# Patient Record
Sex: MALE | Race: BLACK OR AFRICAN AMERICAN | NOT HISPANIC OR LATINO | URBAN - METROPOLITAN AREA
[De-identification: names, ages, dates, MRNs, and addresses within clinical notes are randomized per-mention and may not be internally consistent; named-entity substitution may affect disease eponyms.]

---

## 2021-09-23 ENCOUNTER — OFFICE VISIT (OUTPATIENT)
Dept: URBAN - METROPOLITAN AREA CLINIC 92 | Facility: CLINIC | Age: 71
End: 2021-09-23

## 2021-10-12 ENCOUNTER — OFFICE VISIT (OUTPATIENT)
Dept: URBAN - METROPOLITAN AREA CLINIC 92 | Facility: CLINIC | Age: 71
End: 2021-10-12
Payer: MEDICARE

## 2021-10-12 DIAGNOSIS — R19.8 ALTERNATING CONSTIPATION AND DIARRHEA: ICD-10-CM

## 2021-10-12 PROCEDURE — 99213 OFFICE O/P EST LOW 20 MIN: CPT | Performed by: INTERNAL MEDICINE

## 2021-10-12 RX ORDER — OLANZAPINE 2.5 MG/1
TABLET ORAL
Qty: 0 | Refills: 0 | Status: ACTIVE | COMMUNITY
Start: 1900-01-01

## 2021-10-12 RX ORDER — METOPROLOL SUCCINATE 25 MG/1
TABLET, EXTENDED RELEASE ORAL
Qty: 0 | Refills: 0 | Status: ACTIVE | COMMUNITY
Start: 1900-01-01

## 2021-10-12 RX ORDER — DEXLANSOPRAZOLE 60 MG/1
CAPSULE, DELAYED RELEASE ORAL
Qty: 0 | Refills: 0 | Status: ACTIVE | COMMUNITY
Start: 1900-01-01

## 2021-10-12 RX ORDER — ESCITALOPRAM 20 MG/1
TAKE 1 TABLET (20 MG) BY ORAL ROUTE ONCE DAILY TABLET, FILM COATED ORAL 1
Qty: 0 | Refills: 0 | Status: ACTIVE | COMMUNITY
Start: 1900-01-01

## 2021-10-12 RX ORDER — FLECAINIDE ACETATE 50 MG/1
TABLET ORAL
Qty: 0 | Refills: 0 | Status: ACTIVE | COMMUNITY
Start: 1900-01-01

## 2021-10-12 RX ORDER — APIXABAN 5 MG/1
TABLET, FILM COATED ORAL
Qty: 0 | Refills: 0 | Status: ACTIVE | COMMUNITY
Start: 1900-01-01

## 2021-10-12 RX ORDER — ATORVASTATIN CALCIUM 80 MG/1
TABLET, FILM COATED ORAL
Qty: 0 | Refills: 0 | Status: ACTIVE | COMMUNITY
Start: 1900-01-01

## 2021-10-12 RX ORDER — LATANOPROST/PF 0.005 %
INSTILL 1 DROP INTO AFFECTED EYE(S) BY OPHTHALMIC ROUTE ONCE DAILY IN THE EVENING DROPS OPHTHALMIC (EYE) 1
Qty: 1 | Refills: 0 | Status: ACTIVE | COMMUNITY
Start: 1900-01-01

## 2021-10-12 RX ORDER — METFORMIN HYDROCHLORIDE 500 MG/1
TABLET, COATED ORAL
Qty: 0 | Refills: 0 | Status: ACTIVE | COMMUNITY
Start: 1900-01-01

## 2021-10-12 NOTE — HPI-TODAY'S VISIT:
Pt with several year hx of alternating BMs between diarrhea and constipation. Previously GPP and SIBO tests neg. Trial of zenpep did not help. Colonoscopy 2020 with formed stool and bx neg for colitis. Suspected overflow diarrhea.  Pt denies gi bleeding, nocturnal stools or wt loss unintentionally.

## 2021-11-24 ENCOUNTER — TELEPHONE ENCOUNTER (OUTPATIENT)
Dept: URBAN - METROPOLITAN AREA CLINIC 92 | Facility: CLINIC | Age: 71
End: 2021-11-24

## 2022-07-05 ENCOUNTER — TELEPHONE ENCOUNTER (OUTPATIENT)
Dept: URBAN - METROPOLITAN AREA CLINIC 6 | Facility: CLINIC | Age: 72
End: 2022-07-05

## 2022-07-11 ENCOUNTER — OFFICE VISIT (OUTPATIENT)
Dept: URBAN - METROPOLITAN AREA CLINIC 17 | Facility: CLINIC | Age: 72
End: 2022-07-11
Payer: MEDICARE

## 2022-07-11 VITALS
SYSTOLIC BLOOD PRESSURE: 95 MMHG | HEIGHT: 70 IN | WEIGHT: 247 LBS | TEMPERATURE: 97.9 F | HEART RATE: 71 BPM | DIASTOLIC BLOOD PRESSURE: 65 MMHG | BODY MASS INDEX: 35.36 KG/M2

## 2022-07-11 DIAGNOSIS — E66.9 OBESITY (BMI 30-39.9): ICD-10-CM

## 2022-07-11 DIAGNOSIS — K59.09 CHRONIC CONSTIPATION WITH OVERFLOW: ICD-10-CM

## 2022-07-11 DIAGNOSIS — D86.9 SARCOIDOSIS: ICD-10-CM

## 2022-07-11 DIAGNOSIS — I48.0 PAROXYSMAL ATRIAL FIBRILLATION: ICD-10-CM

## 2022-07-11 DIAGNOSIS — K27.9 PUD (PEPTIC ULCER DISEASE): ICD-10-CM

## 2022-07-11 DIAGNOSIS — E13.9 DIABETES 1.5, MANAGED AS TYPE 2: ICD-10-CM

## 2022-07-11 DIAGNOSIS — Z79.01 ANTICOAGULATION ADEQUATE: ICD-10-CM

## 2022-07-11 DIAGNOSIS — F41.9 ANXIETY: ICD-10-CM

## 2022-07-11 PROCEDURE — 99214 OFFICE O/P EST MOD 30 MIN: CPT | Performed by: INTERNAL MEDICINE

## 2022-07-11 RX ORDER — LATANOPROST/PF 0.005 %
INSTILL 1 DROP INTO AFFECTED EYE(S) BY OPHTHALMIC ROUTE ONCE DAILY IN THE EVENING DROPS OPHTHALMIC (EYE) 1
Qty: 1 | Refills: 0 | Status: ACTIVE | COMMUNITY
Start: 1900-01-01

## 2022-07-11 RX ORDER — METFORMIN HYDROCHLORIDE 500 MG/1
TABLET, COATED ORAL
Qty: 0 | Refills: 0 | Status: ACTIVE | COMMUNITY
Start: 1900-01-01

## 2022-07-11 RX ORDER — DEXLANSOPRAZOLE 60 MG/1
CAPSULE, DELAYED RELEASE ORAL
Qty: 0 | Refills: 0 | Status: ACTIVE | COMMUNITY
Start: 1900-01-01

## 2022-07-11 RX ORDER — ESCITALOPRAM 20 MG/1
TAKE 1 TABLET (20 MG) BY ORAL ROUTE ONCE DAILY TABLET, FILM COATED ORAL 1
Qty: 0 | Refills: 0 | Status: ACTIVE | COMMUNITY
Start: 1900-01-01

## 2022-07-11 RX ORDER — ATORVASTATIN CALCIUM 80 MG/1
TABLET, FILM COATED ORAL
Qty: 0 | Refills: 0 | Status: ACTIVE | COMMUNITY
Start: 1900-01-01

## 2022-07-11 RX ORDER — OLANZAPINE 2.5 MG/1
TABLET ORAL
Qty: 0 | Refills: 0 | Status: ACTIVE | COMMUNITY
Start: 1900-01-01

## 2022-07-11 RX ORDER — FLECAINIDE ACETATE 50 MG/1
TABLET ORAL
Qty: 0 | Refills: 0 | Status: ACTIVE | COMMUNITY
Start: 1900-01-01

## 2022-07-11 RX ORDER — APIXABAN 5 MG/1
TABLET, FILM COATED ORAL
Qty: 0 | Refills: 0 | Status: ACTIVE | COMMUNITY
Start: 1900-01-01

## 2022-07-11 RX ORDER — METOPROLOL SUCCINATE 25 MG/1
TABLET, EXTENDED RELEASE ORAL
Qty: 0 | Refills: 0 | Status: ACTIVE | COMMUNITY
Start: 1900-01-01

## 2022-07-11 NOTE — HPI-TODAY'S VISIT:
2018 67 yo male here for a follow up visit. He has seen ENT and has been tried on a few different medications and is now scheduled for an UGI series. He says his throat burning is not as bad as it was initially. He is taking Dexilant in the am and zantac in the pm. He says the coughing at night is no longer present except he eats and lays down immediately. He has lost about 30 lbs since 12/2015 and is working hard on achieving his goal weight. But also says he is worried that he is loosing weight and doesnt want to be loosing weight because something is wrong. He will discuss this with DR Alas. He has been worried and his appetite is a little low. He does not want an appetite stimulant. ENT MD gave him an antianxiety pill which he takes only prn. Because of some recent stressors, he is now having to take miralax to achieve a BM daily.  7/11/22 I have not seen since 2016 In the interim he has seen Tolu Forte, Namrata Ahuja.  Says BMs are irregular.  Does not think miralax and metamucil are working.  Last week was having 3-4 BMs a day very loose.  In last few days BMs are "almost like normal but I always feel like I still got to go". But then he will have the urge to go and be unproductive.  Has not taken miralax or benefiber in the last 2 weeks.  Usually would take miralax when he has not had a BM then will have 3-4 days of loose BMs.  When he does not have aBM will have LLQ crampy discomfort.Improves with BM. ongoing for years.  Dr Motley performed a colonoscopy in 2020

## 2022-08-09 ENCOUNTER — WEB ENCOUNTER (OUTPATIENT)
Dept: URBAN - METROPOLITAN AREA CLINIC 17 | Facility: CLINIC | Age: 72
End: 2022-08-09

## 2022-08-09 ENCOUNTER — OFFICE VISIT (OUTPATIENT)
Dept: URBAN - METROPOLITAN AREA CLINIC 17 | Facility: CLINIC | Age: 72
End: 2022-08-09
Payer: MEDICARE

## 2022-08-09 ENCOUNTER — DASHBOARD ENCOUNTERS (OUTPATIENT)
Age: 72
End: 2022-08-09

## 2022-08-09 VITALS
BODY MASS INDEX: 35.07 KG/M2 | DIASTOLIC BLOOD PRESSURE: 67 MMHG | TEMPERATURE: 97.2 F | HEART RATE: 81 BPM | HEIGHT: 70 IN | SYSTOLIC BLOOD PRESSURE: 102 MMHG | WEIGHT: 245 LBS

## 2022-08-09 DIAGNOSIS — E66.9 OBESITY (BMI 30-39.9): ICD-10-CM

## 2022-08-09 DIAGNOSIS — E13.9 DIABETES 1.5, MANAGED AS TYPE 2: ICD-10-CM

## 2022-08-09 DIAGNOSIS — K59.09 CHRONIC CONSTIPATION WITH OVERFLOW: ICD-10-CM

## 2022-08-09 DIAGNOSIS — K27.9 PUD (PEPTIC ULCER DISEASE): ICD-10-CM

## 2022-08-09 DIAGNOSIS — Z79.01 ANTICOAGULATION ADEQUATE: ICD-10-CM

## 2022-08-09 DIAGNOSIS — D86.9 SARCOIDOSIS: ICD-10-CM

## 2022-08-09 DIAGNOSIS — F41.9 ANXIETY: ICD-10-CM

## 2022-08-09 DIAGNOSIS — I48.0 PAROXYSMAL ATRIAL FIBRILLATION: ICD-10-CM

## 2022-08-09 PROBLEM — 282825002: Status: ACTIVE | Noted: 2022-07-11

## 2022-08-09 PROBLEM — 162864005: Status: ACTIVE | Noted: 2022-07-11

## 2022-08-09 PROBLEM — 31541009: Status: ACTIVE | Noted: 2022-07-11

## 2022-08-09 PROBLEM — 365634009: Status: ACTIVE | Noted: 2022-07-11

## 2022-08-09 PROBLEM — 426875007: Status: ACTIVE | Noted: 2022-07-11

## 2022-08-09 PROBLEM — 13200003: Status: ACTIVE | Noted: 2022-07-11

## 2022-08-09 PROBLEM — 48694002: Status: ACTIVE | Noted: 2022-07-11

## 2022-08-09 PROCEDURE — 99213 OFFICE O/P EST LOW 20 MIN: CPT | Performed by: INTERNAL MEDICINE

## 2022-08-09 RX ORDER — METFORMIN HYDROCHLORIDE 500 MG/1
TABLET, COATED ORAL
Qty: 0 | Refills: 0 | Status: ACTIVE | COMMUNITY
Start: 1900-01-01

## 2022-08-09 RX ORDER — OLANZAPINE 2.5 MG/1
TABLET ORAL
Qty: 0 | Refills: 0 | Status: ACTIVE | COMMUNITY
Start: 1900-01-01

## 2022-08-09 RX ORDER — METOPROLOL SUCCINATE 25 MG/1
TABLET, EXTENDED RELEASE ORAL
Qty: 0 | Refills: 0 | Status: ACTIVE | COMMUNITY
Start: 1900-01-01

## 2022-08-09 RX ORDER — APIXABAN 5 MG/1
TABLET, FILM COATED ORAL
Qty: 0 | Refills: 0 | Status: ACTIVE | COMMUNITY
Start: 1900-01-01

## 2022-08-09 RX ORDER — ATORVASTATIN CALCIUM 80 MG/1
TABLET, FILM COATED ORAL
Qty: 0 | Refills: 0 | Status: ACTIVE | COMMUNITY
Start: 1900-01-01

## 2022-08-09 RX ORDER — FLECAINIDE ACETATE 50 MG/1
TABLET ORAL
Qty: 0 | Refills: 0 | Status: ACTIVE | COMMUNITY
Start: 1900-01-01

## 2022-08-09 RX ORDER — ESCITALOPRAM 20 MG/1
TAKE 1 TABLET (20 MG) BY ORAL ROUTE ONCE DAILY TABLET, FILM COATED ORAL 1
Qty: 0 | Refills: 0 | Status: ACTIVE | COMMUNITY
Start: 1900-01-01

## 2022-08-09 RX ORDER — DEXLANSOPRAZOLE 60 MG/1
CAPSULE, DELAYED RELEASE ORAL
Qty: 0 | Refills: 0 | Status: ACTIVE | COMMUNITY
Start: 1900-01-01

## 2022-08-09 RX ORDER — LATANOPROST/PF 0.005 %
INSTILL 1 DROP INTO AFFECTED EYE(S) BY OPHTHALMIC ROUTE ONCE DAILY IN THE EVENING DROPS OPHTHALMIC (EYE) 1
Qty: 1 | Refills: 0 | Status: ACTIVE | COMMUNITY
Start: 1900-01-01

## 2022-08-09 NOTE — HPI-TODAY'S VISIT:
2018 67 yo male here for a follow up visit. He has seen ENT and has been tried on a few different medications and is now scheduled for an UGI series. He says his throat burning is not as bad as it was initially. He is taking Dexilant in the am and zantac in the pm. He says the coughing at night is no longer present except he eats and lays down immediately. He has lost about 30 lbs since 12/2015 and is working hard on achieving his goal weight. But also says he is worried that he is loosing weight and doesnt want to be loosing weight because something is wrong. He will discuss this with DR Alas. He has been worried and his appetite is a little low. He does not want an appetite stimulant. ENT MD gave him an antianxiety pill which he takes only prn. Because of some recent stressors, he is now having to take miralax to achieve a BM daily.  7/11/22 I have not seen since 2016 In the interim he has seen Tolu Forte, Namrata Ahuja.  Says BMs are irregular.  Does not think miralax and metamucil are working.  Last week was having 3-4 BMs a day very loose.  In last few days BMs are "almost like normal but I always feel like I still got to go". But then he will have the urge to go and be unproductive.  Has not taken miralax or benefiber in the last 2 weeks.  Usually would take miralax when he has not had a BM then will have 3-4 days of loose BMs.  When he does not have aBM will have LLQ crampy discomfort.Improves with BM. ongoing for years.  Dr Motley performed a colonoscopy in 2020 8/9/22 Here for f.u visit Stopped linzess 72 mcg because  it gave him him diarrhea.  Is happy with where his BMs are now - having a BM daily for the past 8 days, formed.  He has also stopped metamucil Says taking meds now with food instead of on an empty stomach and that has worked better.  Says before linzess was going 2 day w/o a BM and then runny stools for a few days.

## 2023-10-20 ENCOUNTER — OFFICE VISIT (OUTPATIENT)
Dept: URBAN - METROPOLITAN AREA CLINIC 25 | Facility: CLINIC | Age: 73
End: 2023-10-20
Payer: MEDICARE

## 2023-10-20 DIAGNOSIS — R19.4 CHANGE IN BOWEL HABIT: ICD-10-CM

## 2023-10-20 PROCEDURE — 992 APS NON BILLABLE

## 2023-10-20 PROCEDURE — 993 AGA

## 2023-10-20 RX ORDER — FLECAINIDE ACETATE 50 MG/1
TABLET ORAL
Qty: 0 | Refills: 0 | Status: ACTIVE | COMMUNITY
Start: 1900-01-01

## 2023-10-20 RX ORDER — METFORMIN HYDROCHLORIDE 500 MG/1
TABLET, COATED ORAL
Qty: 0 | Refills: 0 | Status: ACTIVE | COMMUNITY
Start: 1900-01-01

## 2023-10-20 RX ORDER — APIXABAN 5 MG/1
TABLET, FILM COATED ORAL
Qty: 0 | Refills: 0 | Status: ACTIVE | COMMUNITY
Start: 1900-01-01

## 2023-10-20 RX ORDER — DEXLANSOPRAZOLE 60 MG/1
CAPSULE, DELAYED RELEASE ORAL
Qty: 0 | Refills: 0 | Status: ACTIVE | COMMUNITY
Start: 1900-01-01

## 2023-10-20 RX ORDER — METOPROLOL SUCCINATE 25 MG/1
TABLET, EXTENDED RELEASE ORAL
Qty: 0 | Refills: 0 | Status: ACTIVE | COMMUNITY
Start: 1900-01-01

## 2023-10-20 RX ORDER — LATANOPROST/PF 0.005 %
INSTILL 1 DROP INTO AFFECTED EYE(S) BY OPHTHALMIC ROUTE ONCE DAILY IN THE EVENING DROPS OPHTHALMIC (EYE) 1
Qty: 1 | Refills: 0 | Status: ACTIVE | COMMUNITY
Start: 1900-01-01

## 2023-10-20 RX ORDER — OLANZAPINE 2.5 MG/1
TABLET ORAL
Qty: 0 | Refills: 0 | Status: ACTIVE | COMMUNITY
Start: 1900-01-01

## 2023-10-20 RX ORDER — ESCITALOPRAM 20 MG/1
TAKE 1 TABLET (20 MG) BY ORAL ROUTE ONCE DAILY TABLET, FILM COATED ORAL 1
Qty: 0 | Refills: 0 | Status: ACTIVE | COMMUNITY
Start: 1900-01-01

## 2023-10-20 RX ORDER — ATORVASTATIN CALCIUM 80 MG/1
TABLET, FILM COATED ORAL
Qty: 0 | Refills: 0 | Status: ACTIVE | COMMUNITY
Start: 1900-01-01

## 2023-10-20 NOTE — HPI-OTHER HISTORIES
2018 65 yo male here for a follow up visit. He has seen ENT and has been tried on a few different medications and is now scheduled for an UGI series. He says his throat burning is not as bad as it was initially. He is taking Dexilant in the am and zantac in the pm. He says the coughing at night is no longer present except he eats and lays down immediately. He has lost about 30 lbs since 12/2015 and is working hard on achieving his goal weight. But also says he is worried that he is loosing weight and doesnt want to be loosing weight because something is wrong. He will discuss this with DR Alas. He has been worried and his appetite is a little low. He does not want an appetite stimulant. ENT MD gave him an antianxiety pill which he takes only prn. Because of some recent stressors, he is now having to take miralax to achieve a BM daily.  7/11/22 I have not seen since 2016 In the interim he has seen Tolu Forte, Namrata Ahuja. Says BMs are irregular. Does not think miralax and metamucil are working. Last week was having 3-4 BMs a day very loose. In last few days BMs are "almost like normal but I always feel like I still got to go". But then he will have the urge to go and be unproductive. Has not taken miralax or benefiber in the last 2 weeks. Usually would take miralax when he has not had a BM then will have 3-4 days of loose BMs. When he does not have aBM will have LLQ crampy discomfort.Improves with BM. ongoing for years. Dr Motley performed a colonoscopy in 2020 8/9/22 Here for f.u visit Stopped linzess 72 mcg because it gave him him diarrhea. Is happy with where his BMs are now - having a BM daily for the past 8 days, formed. He has also stopped metamucil Says taking meds now with food instead of on an empty stomach and that has worked better. Says before linzess was going 2 day w/o a BM and then runny stools for a few days.

## 2024-11-01 NOTE — PHYSICAL EXAM GASTROINTESTINAL
1. The patient has a resolving contusion of his left knee.  At this point nothing more need be done.  2. He has chronic venous insufficiency, but this is stable.  Treatment of choice would be compression knee-high stockings.  3. Depression is unremarkable at this point and he does not really have a major problem with any psychiatric problems for now.  4. He definitely needs to continue with weight reduction. This can be accomplished by eating meals, eliminating snacks and avoiding the consumption of processed carbohydrates.     Abdomen , soft, nontender, nondistended , no guarding or rigidity , no masses palpable , normal bowel sounds , Liver and Spleen , no hepatomegaly present , no hepatosplenomegaly , liver nontender , spleen not palpable Chief Complaint   Patient presents with    Annual Exam     \"Have you been to the ER, urgent care clinic since your last visit?  Hospitalized since your last visit?\"    NO    “Have you seen or consulted any other health care providers outside our system since your last visit?”    NO      “Have you had a colorectal cancer screening such as a colonoscopy/FIT/Cologuard?    NO    No colonoscopy on file  No cologuard on file  No FIT/FOBT on file   No flexible sigmoidoscopy on file             Comes in after sustaining a contusion to his right knee via fall from a forklift.  This is actually getting better gradually.    He has a past history of being overweight, chronic venous insufficiency and periodic reactive depression, which is quite stable.     Dispositions    Return in about 6 months (around 4/23/2025).             Fly Pugh MD